# Patient Record
Sex: FEMALE | Race: BLACK OR AFRICAN AMERICAN | URBAN - METROPOLITAN AREA
[De-identification: names, ages, dates, MRNs, and addresses within clinical notes are randomized per-mention and may not be internally consistent; named-entity substitution may affect disease eponyms.]

---

## 2020-05-16 ENCOUNTER — HOSPITAL ENCOUNTER (OUTPATIENT)
Age: 55
Setting detail: SPECIMEN
Discharge: HOME | End: 2020-05-16
Payer: COMMERCIAL

## 2020-05-16 ENCOUNTER — OFFICE VISIT (OUTPATIENT)
Dept: PRIMARY CARE CLINIC | Age: 55
End: 2020-05-16
Payer: COMMERCIAL

## 2020-05-16 VITALS
TEMPERATURE: 98.7 F | SYSTOLIC BLOOD PRESSURE: 118 MMHG | DIASTOLIC BLOOD PRESSURE: 75 MMHG | OXYGEN SATURATION: 96 % | BODY MASS INDEX: 38.8 KG/M2 | HEIGHT: 63 IN | WEIGHT: 219 LBS | HEART RATE: 63 BPM

## 2020-05-16 PROCEDURE — 99203 OFFICE O/P NEW LOW 30 MIN: CPT | Performed by: NURSE PRACTITIONER

## 2020-05-16 RX ORDER — TRIAMTERENE AND HYDROCHLOROTHIAZIDE 37.5; 25 MG/1; MG/1
TABLET ORAL
COMMUNITY
Start: 2020-04-22

## 2020-05-16 ASSESSMENT — ENCOUNTER SYMPTOMS
SINUS PRESSURE: 0
WHEEZING: 0
VOICE CHANGE: 0
EYE REDNESS: 0
CHEST TIGHTNESS: 0
EYE DISCHARGE: 0
SHORTNESS OF BREATH: 0
COUGH: 1
SORE THROAT: 0

## 2020-05-16 ASSESSMENT — PATIENT HEALTH QUESTIONNAIRE - PHQ9
SUM OF ALL RESPONSES TO PHQ9 QUESTIONS 1 & 2: 0
2. FEELING DOWN, DEPRESSED OR HOPELESS: 0
SUM OF ALL RESPONSES TO PHQ QUESTIONS 1-9: 0
SUM OF ALL RESPONSES TO PHQ QUESTIONS 1-9: 0
1. LITTLE INTEREST OR PLEASURE IN DOING THINGS: 0

## 2020-05-16 NOTE — PROGRESS NOTES
[facility]  [facility]  [address] [place] Means  [address] [address]  Dept: [phone]  Dept Fax: [phone]     Isela Cárdenas is a 49 y.o. female who presents to the urgent care today for her medicalconditions/complaints as noted below. Isela Cárdenas is c/o of Cough and Other (Along with exposure to covid )    HPI:      Cough   This is a new problem. The current episode started in the past 7 days. The problem has been unchanged. The cough is non-productive. Associated symptoms include myalgias. Pertinent negatives include no chest pain, chills, ear pain, eye redness, fever, headaches, postnasal drip, rash, sore throat, shortness of breath or wheezing. 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 No past medical history on file. Current Outpatient Medications   Medication Sig Dispense Refill    triamterene-hydrochlorothiazide (MAXZIDE-25) 37.5-25 MG per tablet        No current facility-administered medications for this visit. Allergies   Allergen Reactions    Bactrim [Sulfamethoxazole-Trimethoprim]     Sulfa Antibiotics Itching     Reviewed PMH, SH, and FH with the patient and updated. Subjective:      Review of Systems   Constitutional: Negative for chills, fatigue and fever. HENT: Negative for congestion, ear discharge, ear pain, postnasal drip, sinus pressure, sneezing, sore throat and voice change. Eyes: Negative for discharge and redness. Respiratory: Positive for cough. Negative for chest tightness, shortness of breath and wheezing. Cardiovascular: Negative. Negative for chest pain. Musculoskeletal: Positive for myalgias. Skin: Negative for rash. Neurological: Negative for dizziness, weakness, light-headedness and headaches. Hematological: Negative for adenopathy. All other systems reviewed and are negative. Objective:      Physical Exam  Vitals signs and nursing note reviewed. Constitutional:       General: She is not in acute distress. Appearance: Normal appearance. She is well-developed. She is not ill-appearing, toxic-appearing or diaphoretic. HENT:      Head: Normocephalic. Comments: Anomaly noted to the left side of the hard palate. Right Ear: Tympanic membrane and external ear normal.      Left Ear: Tympanic membrane and external ear normal.      Nose: Nose normal.      Right Sinus: No maxillary sinus tenderness or frontal sinus tenderness. Left Sinus: No maxillary sinus tenderness or frontal sinus tenderness. Mouth/Throat:      Pharynx: No oropharyngeal exudate or posterior oropharyngeal erythema. Eyes:      General:         Right eye: No discharge. Left eye: No discharge. Cardiovascular:      Rate and Rhythm: Normal rate and regular rhythm. Heart sounds: Normal heart sounds. No murmur. Pulmonary:      Effort: Pulmonary effort is normal. No respiratory distress. Breath sounds: Normal breath sounds. No wheezing or rales. Lymphadenopathy:      Cervical: No cervical adenopathy. Skin:     General: Skin is warm. Findings: No rash. Neurological:      Mental Status: She is alert. /75 (Site: Right Upper Arm, Position: Sitting, Cuff Size: Large Adult)   Pulse 63   Temp 98.7 °F (37.1 °C) (Temporal)   Ht 5' 3\" (1.6 m)   Wt 219 lb (99.3 kg)   SpO2 96%   BMI 38.79 kg/m²     Assessment:       Diagnosis Orders   1. Close exposure to COVID-19 virus  COVID-19 Ambulatory     Plan: Will send out COVID19 testing. Possible treatment alterations based on the results. Patient instructed to self-quarantine until testing results are back. Tylenol as needed for fever/pain. Encouraged adequate hydration and rest.  The patient indicates understanding of these issues and agrees with the plan. Educational materials provided on AVS.  Follow up if symptoms do not improve/worsen.  Discussed symptoms that will warrant urgent ED

## 2020-05-18 LAB — SARS-COV-2, NAA: NOT DETECTED
